# Patient Record
Sex: MALE | Race: WHITE | NOT HISPANIC OR LATINO | Employment: OTHER | URBAN - NONMETROPOLITAN AREA
[De-identification: names, ages, dates, MRNs, and addresses within clinical notes are randomized per-mention and may not be internally consistent; named-entity substitution may affect disease eponyms.]

---

## 2023-06-07 ENCOUNTER — OFFICE VISIT (OUTPATIENT)
Dept: URGENT CARE | Facility: CLINIC | Age: 68
End: 2023-06-07
Payer: COMMERCIAL

## 2023-06-07 VITALS
TEMPERATURE: 98.1 F | DIASTOLIC BLOOD PRESSURE: 88 MMHG | BODY MASS INDEX: 28.68 KG/M2 | SYSTOLIC BLOOD PRESSURE: 158 MMHG | RESPIRATION RATE: 18 BRPM | WEIGHT: 193.6 LBS | OXYGEN SATURATION: 98 % | HEART RATE: 64 BPM | HEIGHT: 69 IN

## 2023-06-07 DIAGNOSIS — S61.012A LACERATION OF LEFT THUMB WITHOUT FOREIGN BODY WITHOUT DAMAGE TO NAIL, INITIAL ENCOUNTER: Primary | ICD-10-CM

## 2023-06-07 PROCEDURE — S9088 SERVICES PROVIDED IN URGENT: HCPCS | Performed by: PHYSICIAN ASSISTANT

## 2023-06-07 PROCEDURE — 99203 OFFICE O/P NEW LOW 30 MIN: CPT | Performed by: PHYSICIAN ASSISTANT

## 2023-06-07 PROCEDURE — 12001 RPR S/N/AX/GEN/TRNK 2.5CM/<: CPT | Performed by: PHYSICIAN ASSISTANT

## 2023-06-07 RX ORDER — ATORVASTATIN CALCIUM 40 MG/1
40 TABLET, FILM COATED ORAL DAILY
COMMUNITY

## 2023-06-07 RX ORDER — SEMAGLUTIDE 1.34 MG/ML
INJECTION, SOLUTION SUBCUTANEOUS
COMMUNITY
Start: 2023-03-16

## 2023-06-07 RX ORDER — ONDANSETRON 4 MG/1
TABLET, ORALLY DISINTEGRATING ORAL
COMMUNITY
Start: 2023-05-20

## 2023-06-07 RX ORDER — OMEPRAZOLE 20 MG/1
20 CAPSULE, DELAYED RELEASE ORAL DAILY
COMMUNITY

## 2023-06-07 RX ORDER — AMLODIPINE BESYLATE 10 MG/1
TABLET ORAL
COMMUNITY
Start: 2023-04-08

## 2023-06-07 RX ORDER — LISINOPRIL 10 MG/1
10 TABLET ORAL DAILY
COMMUNITY

## 2023-06-07 RX ORDER — EMPAGLIFLOZIN 10 MG/1
TABLET, FILM COATED ORAL
COMMUNITY
Start: 2023-04-14

## 2023-06-07 NOTE — PROGRESS NOTES
[facility]        NAME: Ashley Garcia is a 78 y o  male  : 1955    MRN: [id number]  DATE: 2023  TIME: 6:47 PM    Assessment and Plan   Laceration of left thumb without foreign body without damage to nail, initial encounter [[id number]]  1  Laceration of left thumb without foreign body without damage to nail, initial encounter              Patient Instructions   Patient Instructions     Care For Your Stitches   WHAT YOU NEED TO KNOW:   Stitches, or sutures, are used to close cuts and wounds on the skin  Stitches need to be removed after your wound has healed  DISCHARGE INSTRUCTIONS:   Return to the emergency department if:   • Your stitches come apart  • Blood soaks through your bandages  • You suddenly cannot move your injured joint  • You have sudden numbness around your wound  • You see red streaks coming from your wound  Contact your healthcare provider if:   • You have a fever and chills  • Your wound is red, warm, swollen, or leaking pus  • There is a bad smell coming from your wound  • You have increased pain in the wound area  • You have questions or concerns about your condition or care  Care for your stitches:   • Protect the stitches  You may need to cover your stitches with a bandage for 24 to 48 hours, or as directed  Do not bump or hit the suture area  This could open the wound  Do not trim or shorten the ends of your stitches  If they rub on your clothing, put a gauze bandage between the stitches and your clothes  • Clean the area as directed  Carefully wash your wound with soap and water  For mouth and lip wounds, rinse your mouth after meals and at bedtime  Ask your healthcare provider what to use to rinse your mouth  If you have a scalp wound, you may gently wash your hair every 2 days with mild shampoo  Do not use hair products, such as hair spray  Check your wound for signs of infection when you clean it   Signs include redness, swelling, and pus  • Keep the area dry as directed  Wait 12 to 24 hours after you receive your stitches before you take a shower  Take showers instead of baths  Do not take a bath or swim  Your healthcare provider will give you instructions for bathing with your stitches  Help your wound heal:   • Elevate your wound  Keep your wound above the level of your heart as often as you can  This will help decrease swelling and pain  Prop the area on pillows or blankets, if possible, to keep it elevated comfortably  • Limit activity  Do not stretch the skin around your wound  This will help prevent bleeding and swelling  Follow up with your healthcare provider as directed: You may need to return to have your stitches removed  Write down your questions so you remember to ask them during your visits  © Copyright Radha Quezada 2022 Information is for End User's use only and may not be sold, redistributed or otherwise used for commercial purposes  The above information is an  only  It is not intended as medical advice for individual conditions or treatments  Talk to your doctor, nurse or pharmacist before following any medical regimen to see if it is safe and effective for you  Remove your sutures in 7-10 days        Follow up with PCP in 3-5 days  Proceed to  ER if symptoms worsen  Chief Complaint     Chief Complaint   Patient presents with   • Laceration     LEFT THUMB LACERATION  CUTTING THE CHICKEN AND CUT HIS THUMB  hE IS GOOD WITH HIS TITNIUS SHOT  History of Present Illness       The patient presents to the clinic for a laceration on the left thumb that occurred approximately 1 hour ago  He states that he was trying to cut chicken when the knife slipped and he cut the medial aspect of his left thumb  The patient states that he did try to clean the area with peroxide and soap and water  He is having a moderate amount of bleeding    Patient is up-to-date on his "tetanus  Review of Systems   Review of Systems   Musculoskeletal: Negative for neck pain and neck stiffness  Skin:        Laceration of the left thumb as noted above         Current Medications       Current Outpatient Medications:   •  amLODIPine (NORVASC) 10 mg tablet, , Disp: , Rfl:   •  atorvastatin (LIPITOR) 40 mg tablet, Take 40 mg by mouth daily, Disp: , Rfl:   •  Jardiance 10 MG TABS tablet, , Disp: , Rfl:   •  lisinopril (ZESTRIL) 10 mg tablet, Take 10 mg by mouth daily, Disp: , Rfl:   •  metFORMIN (GLUCOPHAGE) 500 mg tablet, Take 500 mg by mouth 2 (two) times a day, Disp: , Rfl:   •  metoprolol succinate (KAPSPARGO SPRINKLE) 25 MG extended-release capsule, Take by mouth, Disp: , Rfl:   •  omeprazole (PriLOSEC) 20 mg delayed release capsule, Take 20 mg by mouth daily, Disp: , Rfl:   •  ondansetron (ZOFRAN-ODT) 4 mg disintegrating tablet, , Disp: , Rfl:   •  Ozempic, 0 25 or 0 5 MG/DOSE, 2 MG/1 5ML injection pen, , Disp: , Rfl:     Current Allergies     Allergies as of 06/07/2023   • (No Known Allergies)            The following portions of the patient's history were reviewed and updated as appropriate: allergies, current medications, past family history, past medical history, past social history, past surgical history and problem list      Past Medical History:   Diagnosis Date   • Diabetes mellitus ([facility] [address] )    • Hypertension        Past Surgical History:   Procedure Laterality Date   • CYST REMOVAL Left     WRIST AREA   • HERNIA REPAIR         History reviewed  No pertinent family history  Medications have been verified  Objective   /88   Pulse 64   Temp 98 1 °F (36 7 °C)   Resp 18   Ht 5' 9\" (1 753 m)   Wt 87 8 kg (193 lb 9 6 oz)   SpO2 98%   BMI 28 59 kg/m²        Physical Exam     Physical Exam  Musculoskeletal:      Left wrist: No tenderness, snuff box tenderness or crepitus  Left hand: Normal range of motion  There is no disruption of two-point discrimination   Normal " "capillary refill  Normal pulse  Hands:       Comments: There is a 2 cm laceration in a \"u\" noted on the left medial thumb   -Patient has full range of motion to flexion, extension of the left thumb  There is no signs of tendon injury  Universal Protocol:  Consent: Verbal consent obtained  Risks and benefits: risks, benefits and alternatives were discussed  Consent given by: patient  Patient understanding: patient states understanding of the procedure being performed  Patient identity confirmed: verbally with patient    Laceration repair    Date/Time: 6/7/2023 5:30 PM    Performed by: Paul Schroeder PA-C  Authorized by: Paul Schroeder PA-C  Body area: upper extremity  Location details: left thumb  Laceration length: 2 cm  Foreign bodies: no foreign bodies  Tendon involvement: none  Nerve involvement: none  Vascular damage: yes  Anesthesia: local infiltration    Anesthesia:  Local Anesthetic: lidocaine 1% without epinephrine  Anesthetic total: 1 mL    Wound Dehiscence:  Superficial Wound Dehiscence: simple closure      Procedure Details:  Irrigation solution: saline  Irrigation method: jet lavage  Amount of cleaning: standard  Skin closure: 4-0 nylon  Approximation difficulty: simple  Dressing: antibiotic ointment        Suture instructions were provided  The patient should have sutures removed in 7 to 10 days  I suggest observe for signs of infection    The patient will follow-up with PCP or go to ER if symptoms worsen  "

## 2023-06-07 NOTE — PATIENT INSTRUCTIONS
Care For Your Stitches   WHAT YOU NEED TO KNOW:   Stitches, or sutures, are used to close cuts and wounds on the skin  Stitches need to be removed after your wound has healed  DISCHARGE INSTRUCTIONS:   Return to the emergency department if:   Your stitches come apart  Blood soaks through your bandages  You suddenly cannot move your injured joint  You have sudden numbness around your wound  You see red streaks coming from your wound  Contact your healthcare provider if:   You have a fever and chills  Your wound is red, warm, swollen, or leaking pus  There is a bad smell coming from your wound  You have increased pain in the wound area  You have questions or concerns about your condition or care  Care for your stitches:   Protect the stitches  You may need to cover your stitches with a bandage for 24 to 48 hours, or as directed  Do not bump or hit the suture area  This could open the wound  Do not trim or shorten the ends of your stitches  If they rub on your clothing, put a gauze bandage between the stitches and your clothes  Clean the area as directed  Carefully wash your wound with soap and water  For mouth and lip wounds, rinse your mouth after meals and at bedtime  Ask your healthcare provider what to use to rinse your mouth  If you have a scalp wound, you may gently wash your hair every 2 days with mild shampoo  Do not use hair products, such as hair spray  Check your wound for signs of infection when you clean it  Signs include redness, swelling, and pus  Keep the area dry as directed  Wait 12 to 24 hours after you receive your stitches before you take a shower  Take showers instead of baths  Do not take a bath or swim  Your healthcare provider will give you instructions for bathing with your stitches  Help your wound heal:   Elevate your wound  Keep your wound above the level of your heart as often as you can  This will help decrease swelling and pain   Prop the area on pillows or blankets, if possible, to keep it elevated comfortably  Limit activity  Do not stretch the skin around your wound  This will help prevent bleeding and swelling  Follow up with your healthcare provider as directed: You may need to return to have your stitches removed  Write down your questions so you remember to ask them during your visits  © Copyright Ricci Cardoso 2022 Information is for End User's use only and may not be sold, redistributed or otherwise used for commercial purposes  The above information is an  only  It is not intended as medical advice for individual conditions or treatments  Talk to your doctor, nurse or pharmacist before following any medical regimen to see if it is safe and effective for you      Remove your sutures in 7-10 days